# Patient Record
Sex: FEMALE | Race: BLACK OR AFRICAN AMERICAN | NOT HISPANIC OR LATINO | Employment: OTHER | URBAN - METROPOLITAN AREA
[De-identification: names, ages, dates, MRNs, and addresses within clinical notes are randomized per-mention and may not be internally consistent; named-entity substitution may affect disease eponyms.]

---

## 2017-12-06 ENCOUNTER — TELEPHONE (OUTPATIENT)
Dept: SURGERY | Facility: CLINIC | Age: 37
End: 2017-12-06

## 2017-12-06 NOTE — TELEPHONE ENCOUNTER
----- Message from Nikhil Zepeda sent at 12/6/2017  3:05 PM CST -----  Patient states that (s)he needs to speak with nurse in ref to the disc she is supposed to bring in to her appt//please call back at [phone]//thank you

## 2017-12-19 ENCOUNTER — LAB VISIT (OUTPATIENT)
Dept: LAB | Facility: HOSPITAL | Age: 37
End: 2017-12-19
Attending: SURGERY
Payer: MEDICARE

## 2017-12-19 ENCOUNTER — INITIAL CONSULT (OUTPATIENT)
Dept: SURGERY | Facility: CLINIC | Age: 37
End: 2017-12-19
Payer: MEDICARE

## 2017-12-19 VITALS
SYSTOLIC BLOOD PRESSURE: 157 MMHG | BODY MASS INDEX: 37.5 KG/M2 | HEART RATE: 90 BPM | WEIGHT: 232.38 LBS | RESPIRATION RATE: 18 BRPM | TEMPERATURE: 99 F | DIASTOLIC BLOOD PRESSURE: 104 MMHG

## 2017-12-19 DIAGNOSIS — K86.2 PANCREATIC PSEUDOCYST/CYST: Primary | ICD-10-CM

## 2017-12-19 DIAGNOSIS — K86.3 PANCREATIC PSEUDOCYST/CYST: Primary | ICD-10-CM

## 2017-12-19 DIAGNOSIS — H54.3 BLINDNESS OF BOTH EYES DUE TO DIABETES MELLITUS: ICD-10-CM

## 2017-12-19 DIAGNOSIS — I25.10 CORONARY ARTERY DISEASE, ANGINA PRESENCE UNSPECIFIED, UNSPECIFIED VESSEL OR LESION TYPE, UNSPECIFIED WHETHER NATIVE OR TRANSPLANTED HEART: ICD-10-CM

## 2017-12-19 DIAGNOSIS — I63.9 CEREBROVASCULAR ACCIDENT (CVA), UNSPECIFIED MECHANISM: ICD-10-CM

## 2017-12-19 DIAGNOSIS — E11.9 DIABETES: ICD-10-CM

## 2017-12-19 DIAGNOSIS — E11.39 BLINDNESS OF BOTH EYES DUE TO DIABETES MELLITUS: ICD-10-CM

## 2017-12-19 DIAGNOSIS — K86.3 PANCREATIC PSEUDOCYST/CYST: ICD-10-CM

## 2017-12-19 DIAGNOSIS — K86.2 PANCREATIC PSEUDOCYST/CYST: ICD-10-CM

## 2017-12-19 LAB
ALBUMIN SERPL BCP-MCNC: 3.5 G/DL
ALP SERPL-CCNC: 125 U/L
ALT SERPL W/O P-5'-P-CCNC: 8 U/L
ANION GAP SERPL CALC-SCNC: 7 MMOL/L
AST SERPL-CCNC: 12 U/L
BASOPHILS # BLD AUTO: 0.04 K/UL
BASOPHILS NFR BLD: 0.6 %
BILIRUB SERPL-MCNC: 0.5 MG/DL
BUN SERPL-MCNC: 14 MG/DL
CALCIUM SERPL-MCNC: 10.1 MG/DL
CHLORIDE SERPL-SCNC: 104 MMOL/L
CO2 SERPL-SCNC: 27 MMOL/L
CREAT SERPL-MCNC: 1.6 MG/DL
DIFFERENTIAL METHOD: ABNORMAL
EOSINOPHIL # BLD AUTO: 0.1 K/UL
EOSINOPHIL NFR BLD: 1.4 %
ERYTHROCYTE [DISTWIDTH] IN BLOOD BY AUTOMATED COUNT: 13.4 %
EST. GFR  (AFRICAN AMERICAN): 47.1 ML/MIN/1.73 M^2
EST. GFR  (NON AFRICAN AMERICAN): 40.9 ML/MIN/1.73 M^2
ESTIMATED AVG GLUCOSE: 194 MG/DL
GLUCOSE SERPL-MCNC: 277 MG/DL
HBA1C MFR BLD HPLC: 8.4 %
HCT VFR BLD AUTO: 38.6 %
HGB BLD-MCNC: 12.2 G/DL
IMM GRANULOCYTES # BLD AUTO: 0.01 K/UL
IMM GRANULOCYTES NFR BLD AUTO: 0.1 %
LYMPHOCYTES # BLD AUTO: 3.1 K/UL
LYMPHOCYTES NFR BLD: 42.6 %
MCH RBC QN AUTO: 25.5 PG
MCHC RBC AUTO-ENTMCNC: 31.6 G/DL
MCV RBC AUTO: 81 FL
MONOCYTES # BLD AUTO: 0.3 K/UL
MONOCYTES NFR BLD: 4.6 %
NEUTROPHILS # BLD AUTO: 3.7 K/UL
NEUTROPHILS NFR BLD: 50.7 %
NRBC BLD-RTO: 0 /100 WBC
PLATELET # BLD AUTO: 384 K/UL
PMV BLD AUTO: 10.4 FL
POTASSIUM SERPL-SCNC: 4.3 MMOL/L
PROT SERPL-MCNC: 8.2 G/DL
RBC # BLD AUTO: 4.79 M/UL
SODIUM SERPL-SCNC: 138 MMOL/L
WBC # BLD AUTO: 7.2 K/UL

## 2017-12-19 PROCEDURE — 99999 PR PBB SHADOW E&M-EST. PATIENT-LVL IV: CPT | Mod: PBBFAC,,, | Performed by: SURGERY

## 2017-12-19 PROCEDURE — 99214 OFFICE O/P EST MOD 30 MIN: CPT | Mod: S$PBB,,, | Performed by: SURGERY

## 2017-12-19 PROCEDURE — 99214 OFFICE O/P EST MOD 30 MIN: CPT | Mod: PBBFAC | Performed by: SURGERY

## 2017-12-19 PROCEDURE — 80053 COMPREHEN METABOLIC PANEL: CPT

## 2017-12-19 PROCEDURE — 83036 HEMOGLOBIN GLYCOSYLATED A1C: CPT

## 2017-12-19 PROCEDURE — 36415 COLL VENOUS BLD VENIPUNCTURE: CPT

## 2017-12-19 PROCEDURE — 85025 COMPLETE CBC W/AUTO DIFF WBC: CPT

## 2017-12-19 RX ORDER — ZOLPIDEM TARTRATE 10 MG/1
5 TABLET ORAL NIGHTLY PRN
COMMUNITY

## 2017-12-19 RX ORDER — INSULIN GLARGINE 100 [IU]/ML
30 INJECTION, SOLUTION SUBCUTANEOUS DAILY
COMMUNITY

## 2017-12-19 RX ORDER — CLOPIDOGREL BISULFATE 75 MG/1
75 TABLET ORAL DAILY
COMMUNITY

## 2017-12-19 RX ORDER — INSULIN LISPRO 100 [IU]/ML
10 INJECTION, SOLUTION INTRAVENOUS; SUBCUTANEOUS
COMMUNITY

## 2017-12-19 NOTE — PROGRESS NOTES
Surgical Oncology  Office Visit    SUBJECTIVE:     History of Present Illness:  Patient is a 37 y.o. female with a history of insulin dependent diabetes (complications include neuropathy and blindness x5 years), CAD, Stroke, HTN and cholelithiasis. She initially was planned for cholecystectomy in 2015 but as symptoms subsided she opted to hold off. In 8/2017 she was admitted 8/6-8/30 at [facility] for severe acute pancreatitis associated with STONEY requiring dialysis. She subsequently developed pseudocysts that have persisted and grown on surveillance imaging, for which she is now referred.  Ms Spears states she feels well at present. She is eating and drinking fairly well. She does occasionally have some early satiety and rare bloating; particularly has trouble with spicy food / fatty foods in terms of discomfort, but when she avoids these certain foods, she generally has no trouble. She reports normal, formed bowel movements without evidence of much exocrine insufficiency. No fevers or chills, CP or SOB, nausea or emesis.      Chief Complaint   Patient presents with    Consult       Review of patient's allergies indicates:   Allergen Reactions    Betadine [povidone-iodine] Itching    Percocet [oxycodone-acetaminophen] Hives       Current Outpatient Prescriptions   Medication Sig Dispense Refill    amlodipine (NORVASC) 10 MG tablet Take 10 mg by mouth once daily.      aspirin 81 MG Chew Take 81 mg by mouth once daily.      clopidogrel (PLAVIX) 75 mg tablet Take 75 mg by mouth once daily.      insulin glargine (LANTUS) 100 unit/mL injection Inject 30 Units into the skin once daily.      insulin lispro (HUMALOG) 100 unit/mL injection Inject 10 Units into the skin 3 (three) times daily before meals.      losartan (COZAAR) 50 MG tablet       lovastatin (MEVACOR) 20 MG tablet       zolpidem (AMBIEN) 10 mg Tab Take 5 mg by mouth nightly as needed.      cloNIDine (CATAPRES) 0.3 MG tablet       clotrimazole (LOTRIMIN)  "1 % cream       econazole nitrate 1 % cream       furosemide (LASIX) 20 MG tablet       glipiZIDE (GLUCOTROL) 10 MG TR24       hydrOXYzine pamoate (VISTARIL) 25 MG Cap       LYRICA 100 mg capsule       meloxicam (MOBIC) 7.5 MG tablet       metformin (GLUCOPHAGE) 1000 MG tablet       mupirocin (BACTROBAN) 2 % ointment       NOVOFINE 32 32 x 1/4 " Ndle       ranitidine (ZANTAC) 300 MG tablet Take 300 mg by mouth every evening.      triamcinolone acetonide 0.1% (KENALOG) 0.1 % cream       VICTOZA 2-HUNTER 0.6 mg/0.1 mL (18 mg/3 mL) PnIj        No current facility-administered medications for this visit.        Past Medical History:   Diagnosis Date    Myocardial infarction     Pancreatitis     Stroke      No past surgical history on file.  No family history on file.  Social History   Substance Use Topics    Smoking status: Never Smoker    Smokeless tobacco: Never Used    Alcohol use No        Review of Systems:  Review of Systems - For pertinent positives please see HPI   Constitutional: Negative for fever and chills. +wt loss following pancreatitis 8/2017  HENT: Negative for congestion and ear pain.   Respiratory: Negative for cough and shortness of breath.   Cardiovascular: Negative for chest pain and palpitations.   Gastrointestinal: Negative for nausea, vomiting, diarrhea, constipation and abdominal distention.   Genitourinary: Negative for dysuria and hematuria.   Musculoskeletal: Negative for myalgias and arthralgias.   Skin: Negative for rash and wound.       OBJECTIVE:     Vital Signs (Most Recent)  Temp: 98.5 °F (36.9 °C) (12/19/17 0935)  Pulse: 90 (12/19/17 0935)  Resp: 18 (12/19/17 0935)  BP: (!) 157/104 (12/19/17 0935)           Physical Exam:  NAD, pleasant  Obese  Blind, ambulatory with assistance  CTAB  RRR  Abdomen soft, obese. Mild ttp epigastrium, RUQ and RLQ without guarding or rebound.    Laboratory  No recent labs      Diagnostic Results:  CT Images personally reviewed. Reports as " follows:  CT 11/22/2017        CT 9/19/2017          ASSESSMENT/PLAN:     37 y.o. female with diabetes (multiple complications), CAD, prior stroke and probably some degree of CKD given need for RRT during recent pancreatitis. Had severe pancreatitis 8/2017 requiring near month-long admission with associated pseudocyst formation and pancreatic necrosis.  Size of complex pseudocyst has increased on last interval scan of 11/2017, however at this time she is surprisingly fairly asymptomatic from this in terms of GOO or signs of infection. Given her comorbidities, in the absence of symptoms, do not see indication to intervene at this point. Rather, favor close observation with interval imaging. If symptoms do develop in the interim, will revisit.  Given report of some difficulty with fatty foods, will prescribe Creon and see if that helps her symptoms.  RTC 3 months with labs and imaging.    Thiago Aguilar MD  Surgery Resident, PGY-V  [phone]    Agree with note above by Dr. Aguliar, pt interviewed, examined, chart, labs, vitals, films reviewed.  I have reviewed and edited the note, the assessment/plan is my own.    Large pseudocyst/WOPN involving entire gland.  She is doing pretty well clinically and I'm not sure necrosectomy or cyst drainage is going to improve on her symptoms.  I would wait a few more months and see how this matures.    Federico Willis MD, FACS  Surgical Oncology  [facility] [place], [place]  Office: [phone]  Fax: [phone]

## 2017-12-19 NOTE — LETTER
December 19, 2017      Selvin Goyal MD  [address]  [address]  [facility] [address]           [facility]  [address] [address]  Phone: [phone]          Patient: Michelle Spears   MR Number: [id number]   YOB: 1980   Date of Visit: 12/19/2017       Dear Dr. Selvin Goyal:    Thank you for referring Michelle Spears to me for evaluation. Attached you will find relevant portions of my assessment and plan of care.    If you have questions, please do not hesitate to call me. I look forward to following Michelle Spears along with you.    Sincerely,    Federico Willis MD    Enclosure  CC:  No Recipients    If you would like to receive this communication electronically, please contact [email or web address] or [phone] to request more information on [employer] Link access.    For providers and/or their staff who would like to refer a patient to [facility], please contact us through our one-stop-shop provider referral line, [facility], at [phone].    If you feel you have received this communication in error or would no longer like to receive these types of communications, please e-mail [email or web address]

## 2018-02-19 DIAGNOSIS — K86.2 PANCREATIC CYST: Primary | ICD-10-CM

## 2018-03-20 ENCOUNTER — OFFICE VISIT (OUTPATIENT)
Dept: SURGERY | Facility: CLINIC | Age: 38
End: 2018-03-20
Payer: MEDICARE

## 2018-03-20 ENCOUNTER — HOSPITAL ENCOUNTER (OUTPATIENT)
Dept: RADIOLOGY | Facility: HOSPITAL | Age: 38
Discharge: HOME | End: 2018-03-20
Attending: SURGERY
Payer: MEDICARE

## 2018-03-20 VITALS
DIASTOLIC BLOOD PRESSURE: 77 MMHG | HEIGHT: 66 IN | HEART RATE: 72 BPM | TEMPERATURE: 98 F | BODY MASS INDEX: 37.41 KG/M2 | WEIGHT: 232.81 LBS | SYSTOLIC BLOOD PRESSURE: 144 MMHG

## 2018-03-20 DIAGNOSIS — K86.3 PANCREATIC PSEUDOCYST: Primary | ICD-10-CM

## 2018-03-20 DIAGNOSIS — K86.2 PANCREATIC CYST: ICD-10-CM

## 2018-03-20 LAB
CREAT SERPL-MCNC: 1.3 MG/DL (ref 0.5–1.4)
SAMPLE: NORMAL

## 2018-03-20 PROCEDURE — 74177 CT ABD & PELVIS W/CONTRAST: CPT | Mod: 26,,, | Performed by: RADIOLOGY

## 2018-03-20 PROCEDURE — 99999 PR PBB SHADOW E&M-EST. PATIENT-LVL III: CPT | Mod: PBBFAC,,, | Performed by: NURSE PRACTITIONER

## 2018-03-20 PROCEDURE — 99213 OFFICE O/P EST LOW 20 MIN: CPT | Mod: S$PBB,,, | Performed by: NURSE PRACTITIONER

## 2018-03-20 PROCEDURE — 74177 CT ABD & PELVIS W/CONTRAST: CPT | Mod: TC

## 2018-03-20 PROCEDURE — 99213 OFFICE O/P EST LOW 20 MIN: CPT | Mod: PBBFAC,25 | Performed by: NURSE PRACTITIONER

## 2018-03-20 PROCEDURE — 25500020 PHARM REV CODE 255: Performed by: SURGERY

## 2018-03-20 RX ORDER — LATANOPROST 50 UG/ML
SOLUTION/ DROPS OPHTHALMIC
Refills: 1 | COMMUNITY
Start: 2018-03-06

## 2018-03-20 RX ORDER — METOPROLOL SUCCINATE 50 MG/1
50 TABLET, EXTENDED RELEASE ORAL DAILY
Refills: 1 | COMMUNITY
Start: 2017-12-27 | End: 2018-03-20

## 2018-03-20 RX ORDER — ATORVASTATIN CALCIUM 40 MG/1
TABLET, FILM COATED ORAL
Refills: 1 | COMMUNITY
Start: 2018-02-20

## 2018-03-20 RX ADMIN — IOHEXOL 100 ML: 350 INJECTION, SOLUTION INTRAVENOUS at 09:03

## 2018-03-27 NOTE — PROGRESS NOTES
Ms. Spears present to clinic after CT to re evaluate her complex pseudocyst.  Ct images viewed by Dr. Willis and me.  No detrimental change.      - Pancreas: There is a an approximately 10 x 21 x 10 cm cystic lesion along the anterior aspect of the entire pancreas with small amount of internal heterogeneity.  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  Note is made of a small amount of nonspecific disorganized fluid/fat haziness at the far left lateral aspect of the lesion.  There is little residual pancreatic parenchyma which is mostly obscured by the cystic lesion.    She remains essentially asymptomatic from it.      PE:  Appears very sleepy.  NAD  BBS cl  HRRR  Abd, ND, NT    PLAN:  RTC 3 months with repeat ct scan.

## 2020-04-24 ENCOUNTER — TELEPHONE (OUTPATIENT)
Dept: OBSTETRICS AND GYNECOLOGY | Facility: CLINIC | Age: 40
End: 2020-04-24

## 2021-05-26 ENCOUNTER — PATIENT OUTREACH (OUTPATIENT)
Dept: ADMINISTRATIVE | Facility: HOSPITAL | Age: 41
End: 2021-05-26